# Patient Record
Sex: MALE | Race: WHITE | NOT HISPANIC OR LATINO
[De-identification: names, ages, dates, MRNs, and addresses within clinical notes are randomized per-mention and may not be internally consistent; named-entity substitution may affect disease eponyms.]

---

## 2019-03-06 ENCOUNTER — TRANSCRIPTION ENCOUNTER (OUTPATIENT)
Age: 69
End: 2019-03-06

## 2019-03-06 ENCOUNTER — EMERGENCY (EMERGENCY)
Facility: HOSPITAL | Age: 69
LOS: 1 days | Discharge: HOME | End: 2019-03-06
Attending: EMERGENCY MEDICINE | Admitting: ORTHOPAEDIC SURGERY
Payer: MEDICARE

## 2019-03-06 VITALS
TEMPERATURE: 98 F | SYSTOLIC BLOOD PRESSURE: 149 MMHG | DIASTOLIC BLOOD PRESSURE: 84 MMHG | OXYGEN SATURATION: 96 % | HEART RATE: 76 BPM | RESPIRATION RATE: 16 BRPM

## 2019-03-06 PROCEDURE — 99285 EMERGENCY DEPT VISIT HI MDM: CPT

## 2019-03-06 PROCEDURE — 73590 X-RAY EXAM OF LOWER LEG: CPT | Mod: 26,LT

## 2019-03-06 PROCEDURE — 73610 X-RAY EXAM OF ANKLE: CPT | Mod: 26,LT

## 2019-03-06 RX ORDER — OXYCODONE AND ACETAMINOPHEN 5; 325 MG/1; MG/1
1 TABLET ORAL ONCE
Qty: 0 | Refills: 0 | Status: DISCONTINUED | OUTPATIENT
Start: 2019-03-06 | End: 2019-03-06

## 2019-03-06 RX ORDER — OXYCODONE AND ACETAMINOPHEN 5; 325 MG/1; MG/1
1 TABLET ORAL
Qty: 20 | Refills: 0
Start: 2019-03-06 | End: 2019-03-10

## 2019-03-06 RX ADMIN — OXYCODONE AND ACETAMINOPHEN 1 TABLET(S): 5; 325 TABLET ORAL at 20:52

## 2019-03-06 RX ADMIN — OXYCODONE AND ACETAMINOPHEN 1 TABLET(S): 5; 325 TABLET ORAL at 21:22

## 2019-03-06 NOTE — ED PROVIDER NOTE - CLINICAL SUMMARY MEDICAL DECISION MAKING FREE TEXT BOX
Patient with left leg fibula fracture, seen by ortho, to be admitted for next day surgery. Patient seen with Tracey Waters, agree with HPI and plan.

## 2019-03-06 NOTE — ED ADULT NURSE NOTE - OBJECTIVE STATEMENT
Pt is a 68YOM who was walking outside and tripped and fell on the ICE, the patient then hurt his left ankle. Pt has swelling to the ankle, but not into the foot, pain is at the distal fib/tib

## 2019-03-06 NOTE — ED ADULT NURSE NOTE - ED STAT RN HANDOFF DETAILS
Report received from RN at 9pm. Assessment available on [facility]. will continue to monitor

## 2019-03-06 NOTE — ED ADULT NURSE REASSESSMENT NOTE - NS ED NURSE REASSESS COMMENT FT1
pt resting comfortably in bed E. family at bedside. awaiting ortho consult. pt c/o discomfort in left foot, ortho MD made aware.

## 2019-03-06 NOTE — ED PROVIDER NOTE - MUSCULOSKELETAL MINIMAL EXAM
LEFT ANKLE- MOD STS, PAIN ON ROM, GOOD PUSLE AND SENSORY DISTALLY, NO ERYTHEMA, NO WARMTH , LATERAL TENDERNESS

## 2019-03-06 NOTE — ED PROVIDER NOTE - OBJECTIVE STATEMENT
this is a 67 yo m w pmhx of htn, hypercholesterolemia and arthritis c/o of left ankle pain s/p fall on ice x 3 hrs. Denies loc, head trauma, neck pain, sob, chest pain, blood thinners. Pt not taken anything for the pain. pain worst w movement

## 2019-03-07 ENCOUNTER — TRANSCRIPTION ENCOUNTER (OUTPATIENT)
Age: 69
End: 2019-03-07

## 2019-03-07 VITALS
HEART RATE: 89 BPM | SYSTOLIC BLOOD PRESSURE: 127 MMHG | TEMPERATURE: 98 F | RESPIRATION RATE: 17 BRPM | DIASTOLIC BLOOD PRESSURE: 78 MMHG | OXYGEN SATURATION: 96 %

## 2019-03-07 DIAGNOSIS — E78.2 MIXED HYPERLIPIDEMIA: ICD-10-CM

## 2019-03-07 DIAGNOSIS — I10 ESSENTIAL (PRIMARY) HYPERTENSION: ICD-10-CM

## 2019-03-07 DIAGNOSIS — M19.90 UNSPECIFIED OSTEOARTHRITIS, UNSPECIFIED SITE: ICD-10-CM

## 2019-03-07 DIAGNOSIS — D72.829 ELEVATED WHITE BLOOD CELL COUNT, UNSPECIFIED: ICD-10-CM

## 2019-03-07 DIAGNOSIS — S82.832A OTHER FRACTURE OF UPPER AND LOWER END OF LEFT FIBULA, INITIAL ENCOUNTER FOR CLOSED FRACTURE: ICD-10-CM

## 2019-03-07 DIAGNOSIS — Z29.9 ENCOUNTER FOR PROPHYLACTIC MEASURES, UNSPECIFIED: ICD-10-CM

## 2019-03-07 LAB
ANION GAP SERPL CALC-SCNC: 5 MMOL/L — SIGNIFICANT CHANGE UP (ref 5–17)
ANION GAP SERPL CALC-SCNC: 6 MMOL/L — SIGNIFICANT CHANGE UP (ref 5–17)
APPEARANCE UR: CLEAR — SIGNIFICANT CHANGE UP
BILIRUB UR-MCNC: NEGATIVE — SIGNIFICANT CHANGE UP
BUN SERPL-MCNC: 27 MG/DL — HIGH (ref 7–23)
BUN SERPL-MCNC: 28 MG/DL — HIGH (ref 7–23)
CALCIUM SERPL-MCNC: 9.1 MG/DL — SIGNIFICANT CHANGE UP (ref 8.5–10.1)
CALCIUM SERPL-MCNC: 9.1 MG/DL — SIGNIFICANT CHANGE UP (ref 8.5–10.1)
CHLORIDE SERPL-SCNC: 106 MMOL/L — SIGNIFICANT CHANGE UP (ref 96–108)
CHLORIDE SERPL-SCNC: 107 MMOL/L — SIGNIFICANT CHANGE UP (ref 96–108)
CO2 SERPL-SCNC: 28 MMOL/L — SIGNIFICANT CHANGE UP (ref 22–31)
CO2 SERPL-SCNC: 29 MMOL/L — SIGNIFICANT CHANGE UP (ref 22–31)
COLOR SPEC: YELLOW — SIGNIFICANT CHANGE UP
CREAT SERPL-MCNC: 1.18 MG/DL — SIGNIFICANT CHANGE UP (ref 0.5–1.3)
CREAT SERPL-MCNC: 1.36 MG/DL — HIGH (ref 0.5–1.3)
DIFF PNL FLD: NEGATIVE — SIGNIFICANT CHANGE UP
GLUCOSE SERPL-MCNC: 116 MG/DL — HIGH (ref 70–99)
GLUCOSE SERPL-MCNC: 146 MG/DL — HIGH (ref 70–99)
GLUCOSE UR QL: NEGATIVE MG/DL — SIGNIFICANT CHANGE UP
HCT VFR BLD CALC: 35.6 % — LOW (ref 39–50)
HCT VFR BLD CALC: 35.8 % — LOW (ref 39–50)
HCV AB S/CO SERPL IA: 0.19 S/CO — SIGNIFICANT CHANGE UP (ref 0–0.79)
HCV AB SERPL-IMP: SIGNIFICANT CHANGE UP
HGB BLD-MCNC: 11.7 G/DL — LOW (ref 13–17)
HGB BLD-MCNC: 11.7 G/DL — LOW (ref 13–17)
INR BLD: 0.99 RATIO — SIGNIFICANT CHANGE UP (ref 0.88–1.16)
INR BLD: 1.02 RATIO — SIGNIFICANT CHANGE UP (ref 0.88–1.16)
KETONES UR-MCNC: NEGATIVE — SIGNIFICANT CHANGE UP
LEUKOCYTE ESTERASE UR-ACNC: NEGATIVE — SIGNIFICANT CHANGE UP
MCHC RBC-ENTMCNC: 28.9 PG — SIGNIFICANT CHANGE UP (ref 27–34)
MCHC RBC-ENTMCNC: 29 PG — SIGNIFICANT CHANGE UP (ref 27–34)
MCHC RBC-ENTMCNC: 32.7 GM/DL — SIGNIFICANT CHANGE UP (ref 32–36)
MCHC RBC-ENTMCNC: 32.9 GM/DL — SIGNIFICANT CHANGE UP (ref 32–36)
MCV RBC AUTO: 88.1 FL — SIGNIFICANT CHANGE UP (ref 80–100)
MCV RBC AUTO: 88.4 FL — SIGNIFICANT CHANGE UP (ref 80–100)
NITRITE UR-MCNC: NEGATIVE — SIGNIFICANT CHANGE UP
NRBC # BLD: 0 /100 WBCS — SIGNIFICANT CHANGE UP (ref 0–0)
NRBC # BLD: 0 /100 WBCS — SIGNIFICANT CHANGE UP (ref 0–0)
PH UR: 5 — SIGNIFICANT CHANGE UP (ref 5–8)
PLATELET # BLD AUTO: 180 K/UL — SIGNIFICANT CHANGE UP (ref 150–400)
PLATELET # BLD AUTO: 215 K/UL — SIGNIFICANT CHANGE UP (ref 150–400)
POTASSIUM SERPL-MCNC: 3.8 MMOL/L — SIGNIFICANT CHANGE UP (ref 3.5–5.3)
POTASSIUM SERPL-MCNC: 4.1 MMOL/L — SIGNIFICANT CHANGE UP (ref 3.5–5.3)
POTASSIUM SERPL-SCNC: 3.8 MMOL/L — SIGNIFICANT CHANGE UP (ref 3.5–5.3)
POTASSIUM SERPL-SCNC: 4.1 MMOL/L — SIGNIFICANT CHANGE UP (ref 3.5–5.3)
PROT UR-MCNC: NEGATIVE MG/DL — SIGNIFICANT CHANGE UP
PROTHROM AB SERPL-ACNC: 11.1 SEC — SIGNIFICANT CHANGE UP (ref 10–12.9)
PROTHROM AB SERPL-ACNC: 11.4 SEC — SIGNIFICANT CHANGE UP (ref 10–12.9)
RBC # BLD: 4.04 M/UL — LOW (ref 4.2–5.8)
RBC # BLD: 4.05 M/UL — LOW (ref 4.2–5.8)
RBC # FLD: 14.1 % — SIGNIFICANT CHANGE UP (ref 10.3–14.5)
RBC # FLD: 14.1 % — SIGNIFICANT CHANGE UP (ref 10.3–14.5)
SODIUM SERPL-SCNC: 140 MMOL/L — SIGNIFICANT CHANGE UP (ref 135–145)
SODIUM SERPL-SCNC: 141 MMOL/L — SIGNIFICANT CHANGE UP (ref 135–145)
SP GR SPEC: 1.02 — SIGNIFICANT CHANGE UP (ref 1.01–1.02)
UROBILINOGEN FLD QL: NEGATIVE MG/DL — SIGNIFICANT CHANGE UP
WBC # BLD: 12.7 K/UL — HIGH (ref 3.8–10.5)
WBC # BLD: 13.34 K/UL — HIGH (ref 3.8–10.5)
WBC # FLD AUTO: 12.7 K/UL — HIGH (ref 3.8–10.5)
WBC # FLD AUTO: 13.34 K/UL — HIGH (ref 3.8–10.5)

## 2019-03-07 PROCEDURE — 71045 X-RAY EXAM CHEST 1 VIEW: CPT | Mod: 26

## 2019-03-07 PROCEDURE — 99222 1ST HOSP IP/OBS MODERATE 55: CPT | Mod: 57,AI

## 2019-03-07 PROCEDURE — 27829 TREAT LOWER LEG JOINT: CPT | Mod: LT

## 2019-03-07 PROCEDURE — 27792 TREATMENT OF ANKLE FRACTURE: CPT | Mod: LT

## 2019-03-07 PROCEDURE — 73600 X-RAY EXAM OF ANKLE: CPT | Mod: 26,LT

## 2019-03-07 PROCEDURE — 76000 FLUOROSCOPY <1 HR PHYS/QHP: CPT | Mod: 26

## 2019-03-07 PROCEDURE — 99223 1ST HOSP IP/OBS HIGH 75: CPT

## 2019-03-07 RX ORDER — HEPARIN SODIUM 5000 [USP'U]/ML
5000 INJECTION INTRAVENOUS; SUBCUTANEOUS ONCE
Qty: 0 | Refills: 0 | Status: COMPLETED | OUTPATIENT
Start: 2019-03-07 | End: 2019-03-07

## 2019-03-07 RX ORDER — ASCORBIC ACID 60 MG
500 TABLET,CHEWABLE ORAL
Qty: 0 | Refills: 0 | Status: DISCONTINUED | OUTPATIENT
Start: 2019-03-07 | End: 2019-03-07

## 2019-03-07 RX ORDER — SIMVASTATIN 20 MG/1
20 TABLET, FILM COATED ORAL AT BEDTIME
Qty: 0 | Refills: 0 | Status: DISCONTINUED | OUTPATIENT
Start: 2019-03-07 | End: 2019-03-07

## 2019-03-07 RX ORDER — FERROUS SULFATE 325(65) MG
325 TABLET ORAL
Qty: 0 | Refills: 0 | Status: DISCONTINUED | OUTPATIENT
Start: 2019-03-07 | End: 2019-03-07

## 2019-03-07 RX ORDER — OXYCODONE HYDROCHLORIDE 5 MG/1
1 TABLET ORAL
Qty: 40 | Refills: 0 | OUTPATIENT
Start: 2019-03-07

## 2019-03-07 RX ORDER — SODIUM CHLORIDE 9 MG/ML
1000 INJECTION, SOLUTION INTRAVENOUS
Qty: 0 | Refills: 0 | Status: DISCONTINUED | OUTPATIENT
Start: 2019-03-07 | End: 2019-03-07

## 2019-03-07 RX ORDER — LOSARTAN POTASSIUM 100 MG/1
50 TABLET, FILM COATED ORAL DAILY
Qty: 0 | Refills: 0 | Status: DISCONTINUED | OUTPATIENT
Start: 2019-03-07 | End: 2019-03-07

## 2019-03-07 RX ORDER — ACETAMINOPHEN 500 MG
650 TABLET ORAL EVERY 6 HOURS
Qty: 0 | Refills: 0 | Status: DISCONTINUED | OUTPATIENT
Start: 2019-03-07 | End: 2019-03-07

## 2019-03-07 RX ORDER — MORPHINE SULFATE 50 MG/1
4 CAPSULE, EXTENDED RELEASE ORAL
Qty: 0 | Refills: 0 | Status: DISCONTINUED | OUTPATIENT
Start: 2019-03-07 | End: 2019-03-07

## 2019-03-07 RX ORDER — FOLIC ACID 0.8 MG
1 TABLET ORAL DAILY
Qty: 0 | Refills: 0 | Status: DISCONTINUED | OUTPATIENT
Start: 2019-03-07 | End: 2019-03-07

## 2019-03-07 RX ORDER — INFLUENZA VIRUS VACCINE 15; 15; 15; 15 UG/.5ML; UG/.5ML; UG/.5ML; UG/.5ML
0.5 SUSPENSION INTRAMUSCULAR ONCE
Qty: 0 | Refills: 0 | Status: DISCONTINUED | OUTPATIENT
Start: 2019-03-07 | End: 2019-03-07

## 2019-03-07 RX ORDER — DOCUSATE SODIUM 100 MG
100 CAPSULE ORAL THREE TIMES A DAY
Qty: 0 | Refills: 0 | Status: DISCONTINUED | OUTPATIENT
Start: 2019-03-07 | End: 2019-03-07

## 2019-03-07 RX ORDER — ONDANSETRON 8 MG/1
1 TABLET, FILM COATED ORAL
Qty: 20 | Refills: 0
Start: 2019-03-07

## 2019-03-07 RX ORDER — HYDROMORPHONE HYDROCHLORIDE 2 MG/ML
0.5 INJECTION INTRAMUSCULAR; INTRAVENOUS; SUBCUTANEOUS EVERY 6 HOURS
Qty: 0 | Refills: 0 | Status: DISCONTINUED | OUTPATIENT
Start: 2019-03-07 | End: 2019-03-07

## 2019-03-07 RX ORDER — ONDANSETRON 8 MG/1
4 TABLET, FILM COATED ORAL EVERY 6 HOURS
Qty: 0 | Refills: 0 | Status: DISCONTINUED | OUTPATIENT
Start: 2019-03-07 | End: 2019-03-07

## 2019-03-07 RX ORDER — OXYCODONE HYDROCHLORIDE 5 MG/1
5 TABLET ORAL EVERY 4 HOURS
Qty: 0 | Refills: 0 | Status: DISCONTINUED | OUTPATIENT
Start: 2019-03-07 | End: 2019-03-07

## 2019-03-07 RX ORDER — CEFAZOLIN SODIUM 1 G
2000 VIAL (EA) INJECTION EVERY 8 HOURS
Qty: 0 | Refills: 0 | Status: DISCONTINUED | OUTPATIENT
Start: 2019-03-07 | End: 2019-03-07

## 2019-03-07 RX ORDER — OXYCODONE HYDROCHLORIDE 5 MG/1
10 TABLET ORAL EVERY 4 HOURS
Qty: 0 | Refills: 0 | Status: DISCONTINUED | OUTPATIENT
Start: 2019-03-07 | End: 2019-03-07

## 2019-03-07 RX ORDER — ASPIRIN/CALCIUM CARB/MAGNESIUM 324 MG
1 TABLET ORAL
Qty: 60 | Refills: 0
Start: 2019-03-07 | End: 2019-04-05

## 2019-03-07 RX ORDER — HYDROMORPHONE HYDROCHLORIDE 2 MG/ML
0.5 INJECTION INTRAMUSCULAR; INTRAVENOUS; SUBCUTANEOUS
Qty: 0 | Refills: 0 | Status: DISCONTINUED | OUTPATIENT
Start: 2019-03-07 | End: 2019-03-07

## 2019-03-07 RX ORDER — METOCLOPRAMIDE HCL 10 MG
10 TABLET ORAL ONCE
Qty: 0 | Refills: 0 | Status: DISCONTINUED | OUTPATIENT
Start: 2019-03-07 | End: 2019-03-07

## 2019-03-07 RX ORDER — DIPHENHYDRAMINE HCL 50 MG
25 CAPSULE ORAL AT BEDTIME
Qty: 0 | Refills: 0 | Status: DISCONTINUED | OUTPATIENT
Start: 2019-03-07 | End: 2019-03-07

## 2019-03-07 RX ORDER — DOCUSATE SODIUM 100 MG
1 CAPSULE ORAL
Qty: 20 | Refills: 0
Start: 2019-03-07

## 2019-03-07 RX ORDER — MAGNESIUM HYDROXIDE 400 MG/1
30 TABLET, CHEWABLE ORAL DAILY
Qty: 0 | Refills: 0 | Status: DISCONTINUED | OUTPATIENT
Start: 2019-03-07 | End: 2019-03-07

## 2019-03-07 RX ORDER — ASPIRIN/CALCIUM CARB/MAGNESIUM 324 MG
325 TABLET ORAL
Qty: 0 | Refills: 0 | Status: DISCONTINUED | OUTPATIENT
Start: 2019-03-07 | End: 2019-03-07

## 2019-03-07 RX ORDER — CELECOXIB 200 MG/1
1 CAPSULE ORAL
Qty: 0 | Refills: 0 | COMMUNITY

## 2019-03-07 RX ADMIN — HYDROMORPHONE HYDROCHLORIDE 0.5 MILLIGRAM(S): 2 INJECTION INTRAMUSCULAR; INTRAVENOUS; SUBCUTANEOUS at 03:14

## 2019-03-07 RX ADMIN — OXYCODONE HYDROCHLORIDE 10 MILLIGRAM(S): 5 TABLET ORAL at 09:00

## 2019-03-07 RX ADMIN — HEPARIN SODIUM 5000 UNIT(S): 5000 INJECTION INTRAVENOUS; SUBCUTANEOUS at 02:38

## 2019-03-07 RX ADMIN — LOSARTAN POTASSIUM 50 MILLIGRAM(S): 100 TABLET, FILM COATED ORAL at 18:02

## 2019-03-07 RX ADMIN — SODIUM CHLORIDE 75 MILLILITER(S): 9 INJECTION, SOLUTION INTRAVENOUS at 02:40

## 2019-03-07 RX ADMIN — OXYCODONE HYDROCHLORIDE 10 MILLIGRAM(S): 5 TABLET ORAL at 08:05

## 2019-03-07 RX ADMIN — SODIUM CHLORIDE 125 MILLILITER(S): 9 INJECTION, SOLUTION INTRAVENOUS at 18:01

## 2019-03-07 NOTE — PHYSICAL THERAPY INITIAL EVALUATION ADULT - ADDITIONAL COMMENTS
Pt lives in a private house with wife. Pt has 5 stairs to enter with B wide handrails and 12 stairs to bedroom. Pt will be staying on the first floor. Pt has cane and no other AD. Prior pt I with all ADLs

## 2019-03-07 NOTE — CHART NOTE - NSCHARTNOTEFT_GEN_A_CORE
Orthopedic Preop Note    Preop Dx: left ankle fracture  Surgeon: Rochelle  Procedure: ORIF    Vital Signs Last 24 Hrs  T(C): 37.3 (07 Mar 2019 05:32), Max: 37.3 (07 Mar 2019 05:32)  T(F): 99.1 (07 Mar 2019 05:32), Max: 99.1 (07 Mar 2019 05:32)  HR: 82 (07 Mar 2019 05:32) (71 - 82)  BP: 145/82 (07 Mar 2019 05:32) (145/82 - 152/94)  BP(mean): --  RR: 17 (07 Mar 2019 05:32) (15 - 18)  SpO2: 97% (07 Mar 2019 05:32) (96% - 98%)  CBC Full  -  ( 07 Mar 2019 08:15 )  WBC Count : 13.34 K/uL  Hemoglobin : 11.7 g/dL  Hematocrit : 35.8 %  Platelet Count - Automated : 180 K/uL  Mean Cell Volume : 88.4 fl  Mean Cell Hemoglobin : 28.9 pg  Mean Cell Hemoglobin Concentration : 32.7 gm/dL  Auto Neutrophil # : x  Auto Lymphocyte # : x  Auto Monocyte # : x  Auto Eosinophil # : x  Auto Basophil # : x  Auto Neutrophil % : x  Auto Lymphocyte % : x  Auto Monocyte % : x  Auto Eosinophil % : x  Auto Basophil % : x    03-07    141  |  107  |  27<H>  ----------------------------<  116<H>  4.1   |  29  |  1.18    Ca    9.1      07 Mar 2019 08:15      PT/INR - ( 07 Mar 2019 08:15 )   PT: 11.4 sec;   INR: 1.02 ratio           Daily Height in cm: 170.18 (07 Mar 2019 04:06)    Daily     EKG: In Chart  CXR: Done  UA: Done  Type and Screen: done  Clearance: documented  Consent: In Chart     Plan:  OR 3/7/19 for left ankle ORIF with Dr. Byrd  NPO except meds  hold anticoagulation   IVF while NPO

## 2019-03-07 NOTE — H&P ADULT - ATTENDING COMMENTS
69yo male L ankle wagner C fracture with syndesmotic involvement.  plan for OR today for fixation of distal fibula and syndesmosis. patient seen and examined.  agree with resident assessment and plan

## 2019-03-07 NOTE — DISCHARGE NOTE PROVIDER - HOSPITAL COURSE
The patient is a 68 year old M status post Open Reduction Surgical Fixation of a Left ankle Fracture after being admitted through [facility] Emergency Room. The Patient was medically Optimized for the Previously mentioned surgical procedure. The patient was taken to the operating room on date mentioned above (3/7). Prophylactic antibiotics were started before the procedure and continued for 24 hours.  There were no complications during the procedure and patient tolerated the procedure well.  The patient was transferred to recovery room in stable condition and subsequently to surgical floor.  Patient was placed on Aspirin for anticoagulation.  All home medications were continued.  The patient received physical therapy daily and daily labs were followed.  The Splint was kept clean, dry, intact. The rest of the hospital stay was unremarkable

## 2019-03-07 NOTE — H&P ADULT - HISTORY OF PRESENT ILLNESS
68y Male community ambulatory presents c/o R/L ankle pain sp mechanical fall. Denies HS/LOC. Denies numbness/tingling. No other pain/injuries. Denies fevers/chills.     HEALTH ISSUES - PROBLEM Dx:        MEDICATIONS  (STANDING):  ascorbic acid 500 milliGRAM(s) Oral two times a day  ferrous    sulfate 325 milliGRAM(s) Oral three times a day with meals  folic acid 1 milliGRAM(s) Oral daily  heparin  Injectable 5000 Unit(s) SubCutaneous once  lactated ringers. 1000 milliLiter(s) IV Continuous <Continuous>  multivitamin 1 Tablet(s) Oral daily    Allergies    No Known Allergies    Intolerances                  Vital Signs Last 24 Hrs  T(C): 36.4 (03-06-19 @ 20:06), Max: 36.4 (03-06-19 @ 20:06)  T(F): 97.5 (03-06-19 @ 20:06), Max: 97.5 (03-06-19 @ 20:06)  HR: 76 (03-06-19 @ 20:06) (76 - 76)  BP: 149/84 (03-06-19 @ 20:06) (149/84 - 149/84)  BP(mean): --  RR: 16 (03-06-19 @ 20:06) (16 - 16)  SpO2: 96% (03-06-19 @ 20:06) (96% - 96%)    Imaging: XR demonstrates R/L ankle fracture    Physical Exam  Gen: Nad  R/L LE: Skin intact, +TTP medial/lateral malleolus, no bony ttp elsewhere, +ehl/fhl/ta/gs function, L3-S1 SILT, dp/pt pulse intact, negative log roll, able to SLR, compartments soft/compressive, extremity warm/well perfused  Secondary Survey: Benign, no bony ttp elsewhere, NV intact    Procedure: 10 cc 1% lidocaine injected under sterile procedure into L ankle joint. Placed in well padded trilam splint. Post procedure imaging obtained. Post procedure exam unchanged, NV intact, able to move all toes, SILT distally.     A/P: 68y Male with L bimalleolar equivalent fracture with probable syndesmotic injury   plan for OR 3/7/19 with Dr. Byrd  NPO  IVF while NPO  hold all AC   Pain control  NWB L LE in splint, keep c/d/I, cane/crutches/walker as needed  Ice/elevation  Si/sx compartment syndrome discussed with patient, told to make nurse/physician aware  discussed with attending, agrees with above 68y Male community ambulatory presents c/o L ankle pain sp mechanical fall after slipping on ice. Denies HS/LOC. Denies numbness/tingling. No other pain/injuries. Denies fevers/chills.     HEALTH ISSUES - PROBLEM Dx:    HTN  HLD    MEDICATIONS  (STANDING):  ascorbic acid 500 milliGRAM(s) Oral two times a day  ferrous    sulfate 325 milliGRAM(s) Oral three times a day with meals  folic acid 1 milliGRAM(s) Oral daily  heparin  Injectable 5000 Unit(s) SubCutaneous once  lactated ringers. 1000 milliLiter(s) IV Continuous <Continuous>  multivitamin 1 Tablet(s) Oral daily    Allergies    No Known Allergies    Intolerances        Vital Signs Last 24 Hrs  T(C): 36.4 (03-06-19 @ 20:06), Max: 36.4 (03-06-19 @ 20:06)  T(F): 97.5 (03-06-19 @ 20:06), Max: 97.5 (03-06-19 @ 20:06)  HR: 76 (03-06-19 @ 20:06) (76 - 76)  BP: 149/84 (03-06-19 @ 20:06) (149/84 - 149/84)  BP(mean): --  RR: 16 (03-06-19 @ 20:06) (16 - 16)  SpO2: 96% (03-06-19 @ 20:06) (96% - 96%)    Imaging: XR demonstrates L bimalleolar equivalent fracture with possible syndesmotic injury     Physical Exam  Gen: Nad  L LE: Skin intact, +TTP medial/lateral malleolus, no bony ttp elsewhere, +ehl/fhl/ta/gs function, L3-S1 SILT, dp/pt pulse intact, negative log roll, able to SLR, compartments soft/compressive, extremity warm/well perfused  Secondary Survey: Benign, no bony ttp elsewhere, NV intact    Procedure: 10 cc 1% lidocaine injected under sterile procedure into L ankle joint. Placed in well padded trilam splint. Post procedure imaging obtained. Post procedure exam unchanged, NV intact, able to move all toes, SILT distally.     A/P: 68y Male with L bimalleolar equivalent fracture with probable syndesmotic injury   plan for OR 3/7/19 with Dr. Byrd  NPO  IVF while NPO  hold all AC   Pain control  NWB L LE in splint, keep c/d/I, cane/crutches/walker as needed  Ice/elevation  Si/sx compartment syndrome discussed with patient, told to make nurse/physician aware  discussed with attending, agrees with above

## 2019-03-07 NOTE — PROGRESS NOTE ADULT - SUBJECTIVE AND OBJECTIVE BOX
Patient is seen and examined at bedside. Denies CP/SOB/Dizziness/N/V/D/HA. Pain is controlled.     Vital Signs Last 24 Hrs  T(C): 37.3 (07 Mar 2019 17:58), Max: 37.3 (07 Mar 2019 05:32)  T(F): 99.1 (07 Mar 2019 17:58), Max: 99.2 (07 Mar 2019 14:07)  HR: 81 (07 Mar 2019 17:58) (70 - 86)  BP: 109/71 (07 Mar 2019 17:58) (109/71 - 152/94)  BP(mean): --  RR: 16 (07 Mar 2019 17:58) (13 - 26)  SpO2: 95% (07 Mar 2019 17:58) (95% - 98%)    GEN: NAD  LLE: Splint C/D/I.    Motor intact + EHL/FHL in the BL LE. Sensation is grossly intact distal . Extremity warm. Compartments are soft. DP 1+    Labs:                          11.7   13.34 )-----------( 180      ( 07 Mar 2019 08:15 )             35.8       03-07    141  |  107  |  27<H>  ----------------------------<  116<H>  4.1   |  29  |  1.18    Ca    9.1      07 Mar 2019 08:15        A/P: Patient is a 68y y/o Male s/p L Ankle Joshua Fx, POD # 0    -Pain control/analgesia  -Inc spirometry  -Venodynes/foot pumps  -F/U AM Labs  -PT/OT/WBAT  -Antibiotic  -Anticoagulation  -DC HOME NOW

## 2019-03-07 NOTE — CONSULT NOTE ADULT - SUBJECTIVE AND OBJECTIVE BOX
Pt is a 68y Male w/pmhx of HTN, HLD and OA physically fairly active, no prior cardiac hx or w/u presents c/o L ankle pain sp mechanical fall after slipping on ice.  States was putting granddaughter in car and slipped on ice fell and granddaughter fell on his ankle.  Pt Denies HS/LOC. Denies numbness/tingling. No other pain/injuries. Denies fevers/chills. Pt denies any cp palpitations, sob after strenuous physical activities which he was doing prior to this event.    HEALTH ISSUES - PROBLEM Dx:    HTN  HLD    MEDICATIONS  (STANDING):  ascorbic acid 500 milliGRAM(s) Oral two times a day  ferrous    sulfate 325 milliGRAM(s) Oral three times a day with meals  folic acid 1 milliGRAM(s) Oral daily  heparin  Injectable 5000 Unit(s) SubCutaneous once  lactated ringers. 1000 milliLiter(s) IV Continuous <Continuous>  multivitamin 1 Tablet(s) Oral daily    Allergies    No Known Allergies    Intolerances          Imaging: XR demonstrates L bimalleolar equivalent fracture with possible syndesmotic injury     Hypertension  Arthritis  No significant past surgical history      REVIEW OF SYSTEMS:    CONSTITUTIONAL: No fever, weight loss, or fatigue  EYES: No eye pain, visual disturbances, or discharge  ENMT:  No difficulty hearing, tinnitus, vertigo; No sinus or throat pain  NECK: No pain or stiffness  BREASTS: No pain, masses, or nipple discharge  RESPIRATORY: No cough, wheezing, chills or hemoptysis; No shortness of breath  CARDIOVASCULAR: No chest pain, palpitations, dizziness, or leg swelling  GASTROINTESTINAL: No abdominal or epigastric pain. No nausea, vomiting, or hematemesis; No diarrhea or constipation. No melena or hematochezia.  GENITOURINARY: No dysuria, frequency, hematuria, or incontinence  NEUROLOGICAL: No headaches, memory loss, loss of strength, numbness, or tremors  SKIN: No itching, burning, rashes, or lesions   LYMPH NODES: No enlarged glands  ENDOCRINE: No heat or cold intolerance; No hair loss  MUSCULOSKELETAL: OA and pain in ankle as above  PSYCHIATRIC: No depression, anxiety, mood swings, or difficulty sleeping  HEME/LYMPH: No easy bruising, or bleeding gums  ALLERGY AND IMMUNOLOGIC: No hives or eczema      MEDICATIONS  (STANDING):  ascorbic acid 500 milliGRAM(s) Oral two times a day  ferrous    sulfate 325 milliGRAM(s) Oral three times a day with meals  folic acid 1 milliGRAM(s) Oral daily  influenza   Vaccine 0.5 milliLiter(s) IntraMuscular once  lactated ringers. 1000 milliLiter(s) (75 mL/Hr) IV Continuous <Continuous>  multivitamin 1 Tablet(s) Oral daily  simvastatin 20 milliGRAM(s) Oral at bedtime    MEDICATIONS  (PRN):  HYDROmorphone  Injectable 0.5 milliGRAM(s) IV Push every 6 hours PRN Severe Pain (7 - 10)  oxyCODONE    IR 10 milliGRAM(s) Oral every 4 hours PRN Moderate Pain (4 - 6)  oxyCODONE    IR 5 milliGRAM(s) Oral every 4 hours PRN Mild Pain (1 - 3)      Allergies    No Known Allergies    Intolerances        SOCIAL HISTORY:    FAMILY HISTORY:  Mother CAD in 70's      Physical Exam    Vital Signs Last 24 Hrs  T(C): 37.3 (07 Mar 2019 05:32), Max: 37.3 (07 Mar 2019 05:32)  T(F): 99.1 (07 Mar 2019 05:32), Max: 99.1 (07 Mar 2019 05:32)  HR: 82 (07 Mar 2019 05:32) (71 - 82)  BP: 145/82 (07 Mar 2019 05:32) (145/82 - 152/94)  BP(mean): --  RR: 17 (07 Mar 2019 05:32) (15 - 18)  SpO2: 97% (07 Mar 2019 05:32) (96% - 98%)    PHYSICAL EXAM:    GENERAL: NAD, well-groomed, well-developed  HEAD:  Atraumatic, Normocephalic  EYES: EOMI, PERRLA, conjunctiva and sclera clear  ENMT: No tonsillar erythema, exudates, or enlargement; Moist mucous membranes, No lesions  NECK: Supple, No JVD, Normal thyroid  NERVOUS SYSTEM:  Alert & Oriented X3, Good concentration; Motor Strength 5/5 B/L upper and R lower extremities (left not moved w/pain); DTRs 2+ intact and symmetric  CHEST/LUNG: Clear to percussion bilaterally; No rales, rhonchi, wheezing, or rubs  HEART: Regular rate and rhythm; No rubs, or gallops, +S1,S2  ABDOMEN: Soft, Nontender, Nondistended; Bowel sounds present  EXTREMITIES:  2+ Peripheral Pulses, No clubbing, cyanosis, or edema  LYMPH: No cervical adenopathy  RECTAL: deferred  BREAST: No palpatble masses, skin no lesions   : deferred  SKIN: No rashes or lesions            LABS:                        11.7   12.70 )-----------( 215      ( 07 Mar 2019 01:36 )             35.6     03-07    140  |  106  |  28<H>  ----------------------------<  146<H>  3.8   |  28  |  1.36<H>    Ca    9.1      07 Mar 2019 01:36      PT/INR - ( 07 Mar 2019 02:28 )   PT: 11.1 sec;   INR: 0.99 ratio           Urinalysis Basic - ( 07 Mar 2019 02:31 )    Color: Yellow / Appearance: Clear / S.025 / pH: x  Gluc: x / Ketone: Negative  / Bili: Negative / Urobili: Negative mg/dL   Blood: x / Protein: Negative mg/dL / Nitrite: Negative   Leuk Esterase: Negative / RBC: x / WBC x   Sq Epi: x / Non Sq Epi: x / Bacteria: x        RADIOLOGY & ADDITIONAL STUDIES:

## 2019-03-07 NOTE — PROGRESS NOTE ADULT - SUBJECTIVE AND OBJECTIVE BOX
Patient seen and examined. Pain controlled. No acute events overnight    Vital Signs Last 24 Hrs  T(C): 37.3 (03-07-19 @ 05:32), Max: 37.3 (03-07-19 @ 05:32)  T(F): 99.1 (03-07-19 @ 05:32), Max: 99.1 (03-07-19 @ 05:32)  HR: 82 (03-07-19 @ 05:32) (71 - 82)  BP: 145/82 (03-07-19 @ 05:32) (145/82 - 152/94)  BP(mean): --  RR: 17 (03-07-19 @ 05:32) (15 - 18)  SpO2: 97% (03-07-19 @ 05:32) (96% - 98%)    Physical Exam  Gen: NAD  LLE:   Dressing c/d/i  +wiggles toes  SILT toes 1-5  in splint  DP +  Compartments soft  No calf ttp    A/P: 68y Male with L bimalleolar equivalent fracture with syndesmotic injury   plan for OR today   NPO  IVF while NPO   hold all AC   Pain control  DVT ppx  PT/OT, NWB LLE  FU labs  Dispo planning  D/w attending

## 2019-03-07 NOTE — PROGRESS NOTE ADULT - ASSESSMENT
Pt is a 68y Male w/pmhx of HTN, HLD and OA, anxiety (takes medication at night but does not recall name) physically fairly active, no prior cardiac hx or w/u presents c/o L ankle pain sp mechanical fall after slipping on ice.

## 2019-03-07 NOTE — BRIEF OPERATIVE NOTE - PROCEDURE
<<-----Click on this checkbox to enter Procedure Open reduction and internal fixation (ORIF) of ankle  03/07/2019  distal fibula ORIF, syndesmotic screw fixation  Active  NEHA

## 2019-03-07 NOTE — DISCHARGE NOTE PROVIDER - CARE PROVIDER_API CALL
Kaiden Byrd)  Orthopaedic Surgery  [address], [address]  [place] [address]  Phone: [phone]  Fax: [phone]  Follow Up Time:

## 2019-03-07 NOTE — ED ADULT NURSE REASSESSMENT NOTE - NS ED NURSE REASSESS COMMENT FT1
pt resting comfortably in bed E. pain medication administered. awaiting transport to unit. safety measures continued. repositioned for comfort. LR running in IV

## 2019-03-07 NOTE — PROGRESS NOTE ADULT - SUBJECTIVE AND OBJECTIVE BOX
Patient is a 68y old  Male who presents with a chief complaint of left ankle pain (07 Mar 2019 07:29)      INTERVAL HPI/OVERNIGHT EVENTS:  c/o ankle pain with movement, otherwise "sore", no cp, no sob, no n/v, no fevers or chills, no dysuria    MEDICATIONS  (STANDING):  ascorbic acid 500 milliGRAM(s) Oral two times a day  ferrous    sulfate 325 milliGRAM(s) Oral three times a day with meals  folic acid 1 milliGRAM(s) Oral daily  influenza   Vaccine 0.5 milliLiter(s) IntraMuscular once  lactated ringers. 1000 milliLiter(s) (75 mL/Hr) IV Continuous <Continuous>  multivitamin 1 Tablet(s) Oral daily  simvastatin 20 milliGRAM(s) Oral at bedtime    MEDICATIONS  (PRN):  HYDROmorphone  Injectable 0.5 milliGRAM(s) IV Push every 6 hours PRN Severe Pain (7 - 10)  oxyCODONE    IR 10 milliGRAM(s) Oral every 4 hours PRN Moderate Pain (4 - 6)  oxyCODONE    IR 5 milliGRAM(s) Oral every 4 hours PRN Mild Pain (1 - 3)      Allergies    No Known Allergies    Intolerances        REVIEW OF SYSTEMS:  CONSTITUTIONAL: No fever, weight loss, + fatigue  EYES: No eye pain, visual disturbances, or discharge  ENMT:  No difficulty hearing, tinnitus, vertigo; No sinus or throat pain  RESPIRATORY: No cough, wheezing, chills or hemoptysis; No shortness of breath  CARDIOVASCULAR: No chest pain, palpitations, dizziness, or leg swelling  GASTROINTESTINAL: No abdominal or epigastric pain. No nausea, vomiting, or hematemesis; No diarrhea or constipation. No melena or hematochezia.  GENITOURINARY: No dysuria, frequency, hematuria, or incontinence  NEUROLOGICAL: No headaches, memory loss, loss of strength, numbness, or tremors  MUSCULOSKELETAL:+ ankle pain  PSYCHIATRIC: No depression, anxiety, mood swings, or difficulty sleeping      Vital Signs Last 24 Hrs  T(C): 37.3 (07 Mar 2019 05:32), Max: 37.3 (07 Mar 2019 05:32)  T(F): 99.1 (07 Mar 2019 05:32), Max: 99.1 (07 Mar 2019 05:32)  HR: 82 (07 Mar 2019 05:32) (71 - 82)  BP: 145/82 (07 Mar 2019 05:32) (145/82 - 152/94)  BP(mean): --  RR: 17 (07 Mar 2019 05:32) (15 - 18)  SpO2: 97% (07 Mar 2019 05:32) (96% - 98%)    PHYSICAL EXAM:  GENERAL: NAD, well-groomed, well-developed  HEAD:  Atraumatic, Normocephalic  EYES: EOMI, PERRLA, conjunctiva and sclera clear  ENMT: No tonsillar erythema, exudates, or enlargement; Moist mucous membranes, Good dentition, No lesions  NECK: Supple, No JVD, Normal thyroid  NERVOUS SYSTEM:  Alert & Oriented X3, Good concentration; Motor Strength 5/5 B/L upper and lower extremities; DTRs 2+ intact and symmetric  CHEST/LUNG: Clear to percussion bilaterally; No rales, rhonchi, wheezing, or rubs  HEART: Regular rate and rhythm; No murmurs, rubs, or gallops  ABDOMEN: Soft, Nontender, Nondistended; Bowel sounds present  EXTREMITIES: LLE, dressing c/d/i, wiggles toes, in splint, RLE no calf tenderness or edema  SKIN: No rashes or lesions    LABS:                        11.7   13.34 )-----------( 180      ( 07 Mar 2019 08:15 )             35.8     03-07    141  |  107  |  27<H>  ----------------------------<  116<H>  4.1   |  29  |  1.18    Ca    9.1      07 Mar 2019 08:15      PT/INR - ( 07 Mar 2019 08:15 )   PT: 11.4 sec;   INR: 1.02 ratio           Urinalysis Basic - ( 07 Mar 2019 02:31 )    Color: Yellow / Appearance: Clear / S.025 / pH: x  Gluc: x / Ketone: Negative  / Bili: Negative / Urobili: Negative mg/dL   Blood: x / Protein: Negative mg/dL / Nitrite: Negative   Leuk Esterase: Negative / RBC: x / WBC x   Sq Epi: x / Non Sq Epi: x / Bacteria: x      CAPILLARY BLOOD GLUCOSE          RADIOLOGY & ADDITIONAL TESTS:    Imaging Personally Reviewed:  [ x] YES  [ ] NO    Consultant(s) Notes Reviewed:  [x ] YES  [ ] NO    Care Discussed with Consultants/Other Providers [ x] YES  [ ] NO

## 2019-03-07 NOTE — DISCHARGE NOTE PROVIDER - NSDCCPTREATMENT_GEN_ALL_CORE_FT
PRINCIPAL PROCEDURE  Procedure: Open reduction and internal fixation (ORIF) of ankle  Findings and Treatment: distal fibula ORIF, syndesmotic screw fixation

## 2019-03-07 NOTE — CONSULT NOTE ADULT - ASSESSMENT
Pt w/htn, hld oa w/fall on ice and L bimalleolar equivalent fracture.  Pt is currently medically optimized to proceed with surgery.  Hold his losartan perioperatively which he hasnt taken since yesterday. currently he is normotensive, if need to control bp later use small dose of labetolol.  can resume losartan post op.

## 2019-03-07 NOTE — DISCHARGE NOTE NURSING/CASE MANAGEMENT/SOCIAL WORK - NSDCDPATPORTLINK_GEN_ALL_CORE
You can access the [facility] Portal, offered by [facility], by registering with the following website: [email or web address]

## 2019-03-07 NOTE — DISCHARGE NOTE NURSING/CASE MANAGEMENT/SOCIAL WORK - NSDCPNDISPN_GEN_ALL_CORE
Safe use, storage and disposal of opioids when prescribed/Side effects of pain management treatment/Activities of daily living, including home environment that might     exacerbate pain or reduce effectiveness of the pain management plan of care as well as strategies to address these issues/Education provided on the pain management plan of care

## 2019-03-11 PROBLEM — M19.90 UNSPECIFIED OSTEOARTHRITIS, UNSPECIFIED SITE: Chronic | Status: ACTIVE | Noted: 2019-03-06

## 2019-03-11 PROBLEM — I10 ESSENTIAL (PRIMARY) HYPERTENSION: Chronic | Status: ACTIVE | Noted: 2019-03-07

## 2019-03-11 PROBLEM — E78.5 HYPERLIPIDEMIA, UNSPECIFIED: Chronic | Status: ACTIVE | Noted: 2019-03-07

## 2019-03-20 DIAGNOSIS — S82.832A OTHER FRACTURE OF UPPER AND LOWER END OF LEFT FIBULA, INITIAL ENCOUNTER FOR CLOSED FRACTURE: ICD-10-CM
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
## 2019-03-20 DIAGNOSIS — M19.90 UNSPECIFIED OSTEOARTHRITIS, UNSPECIFIED SITE: ICD-10-CM

## 2019-03-20 DIAGNOSIS — M25.572 PAIN IN LEFT ANKLE AND JOINTS OF LEFT FOOT: ICD-10-CM

## 2019-03-22 ENCOUNTER — APPOINTMENT (OUTPATIENT)
Dept: ORTHOPEDIC SURGERY | Facility: CLINIC | Age: 69
End: 2019-03-22
Payer: MEDICARE

## 2019-03-22 DIAGNOSIS — Z00.00 ENCOUNTER FOR GENERAL ADULT MEDICAL EXAMINATION W/OUT ABNORMAL FINDINGS: ICD-10-CM

## 2019-03-22 PROCEDURE — 73610 X-RAY EXAM OF ANKLE: CPT | Mod: LT

## 2019-03-22 PROCEDURE — 99024 POSTOP FOLLOW-UP VISIT: CPT

## 2019-03-22 NOTE — HISTORY OF PRESENT ILLNESS
[de-identified] : L ankle [de-identified] : 67 yo M s/p Left ankle open reduction and internal fixation of distal fibula fracture, fixation of syndesmosis, 3/7/19. doing well.  pain controlled.  compliant w NWB [de-identified] : left ankle exam.\par inc healed well. no erythema\par no pain gentle passive rom\par able to move all toes\par sensations intact light touch\par bcr\par  [de-identified] : \par The following radiographs were ordered and read by me during this patients visit. I reviewed each radiograph in detail with the patient and discussed the findings as highlighted below. \par 3 views of the left ankle were obtained today. Status post open reduction internal fixation lateral plate and screws and syndesmotic fixation. Intact hardware. Mortise reduced\par \par  [de-identified] : 67 yo M s/p Left ankle open reduction and internal fixation of distal fibula fracture, fixation of syndesmosis, 3/7/19. [de-identified] : 2 weeks status post ORIF left ankle doing well. Sutures removed Steri-Strips placed. Continue nonweightbearing placed in cam boot. Physical therapy. Followup one month. All questions answered

## 2019-04-26 ENCOUNTER — APPOINTMENT (OUTPATIENT)
Dept: ORTHOPEDIC SURGERY | Facility: CLINIC | Age: 69
End: 2019-04-26
Payer: MEDICARE

## 2019-04-26 PROCEDURE — 99024 POSTOP FOLLOW-UP VISIT: CPT

## 2019-04-26 PROCEDURE — 73610 X-RAY EXAM OF ANKLE: CPT | Mod: LT

## 2019-04-26 RX ORDER — OXYCODONE AND ACETAMINOPHEN 5; 325 MG/1; MG/1
5-325 TABLET ORAL
Qty: 20 | Refills: 0 | Status: ACTIVE | COMMUNITY
Start: 2019-03-06

## 2019-04-26 RX ORDER — ASCORBIC ACID 500 MG
TABLET ORAL
Refills: 0 | Status: ACTIVE | COMMUNITY

## 2019-04-26 RX ORDER — ATORVASTATIN CALCIUM 40 MG/1
40 TABLET, FILM COATED ORAL
Qty: 90 | Refills: 0 | Status: ACTIVE | COMMUNITY
Start: 2019-02-08

## 2019-04-26 RX ORDER — SERTRALINE HYDROCHLORIDE 100 MG/1
100 TABLET, FILM COATED ORAL
Qty: 135 | Refills: 0 | Status: ACTIVE | COMMUNITY
Start: 2018-12-20

## 2019-04-26 RX ORDER — SERTRALINE HYDROCHLORIDE 20 MG/ML
SOLUTION, CONCENTRATE ORAL
Refills: 0 | Status: ACTIVE | COMMUNITY

## 2019-04-26 RX ORDER — LOSARTAN POTASSIUM AND HYDROCHLOROTHIAZIDE 25; 100 MG/1; MG/1
100-25 TABLET ORAL
Qty: 90 | Refills: 0 | Status: ACTIVE | COMMUNITY
Start: 2019-04-08

## 2019-04-26 RX ORDER — MULTIVITAMIN
TABLET ORAL
Refills: 0 | Status: ACTIVE | COMMUNITY

## 2019-04-26 RX ORDER — ONDANSETRON 4 MG/1
4 TABLET ORAL
Qty: 9 | Refills: 0 | Status: ACTIVE | COMMUNITY
Start: 2019-03-07

## 2019-04-26 RX ORDER — OXYCODONE 5 MG/1
5 TABLET ORAL
Qty: 40 | Refills: 0 | Status: ACTIVE | COMMUNITY
Start: 2019-03-07

## 2019-04-26 RX ORDER — CELECOXIB 50 MG/1
CAPSULE ORAL
Refills: 0 | Status: ACTIVE | COMMUNITY

## 2019-04-26 RX ORDER — CELECOXIB 200 MG/1
200 CAPSULE ORAL
Qty: 90 | Refills: 0 | Status: ACTIVE | COMMUNITY
Start: 2018-10-25

## 2019-04-26 RX ORDER — ATORVASTATIN CALCIUM 80 MG/1
TABLET, FILM COATED ORAL
Refills: 0 | Status: ACTIVE | COMMUNITY

## 2019-04-26 RX ORDER — CHROMIUM 200 MCG
TABLET ORAL
Refills: 0 | Status: ACTIVE | COMMUNITY

## 2019-04-29 NOTE — HISTORY OF PRESENT ILLNESS
[de-identified] : L ankle [de-identified] : 67 yo M s/p Left ankle open reduction and internal fixation of distal fibula fracture, fixation of syndesmosis, 3/7/19.  He is doing well, happy w progress so far.  Going to PT.  Not taking pain medicine.  Denies numbness/tingling. [de-identified] : left ankle exam.\par inc healed well. no erythema\par no pain gentle passive rom\par able to move all toes\par sensations intact light touch\par bcr\par  [de-identified] : \par The following radiographs were ordered and read by me during this patients visit. I reviewed each radiograph in detail with the patient and discussed the findings as highlighted below. \par \par 3 views L ankle demonstrate interval healing. well positioned hardware. mmortise reduced [de-identified] : 67 yo M s/p Left ankle open reduction and internal fixation of distal fibula fracture, fixation of syndesmosis, 3/7/19. [de-identified] : 3 views left ankle obtained today. Interval healing of fibula shaft fracture syndesmosis is reduced.\par \par 6 weeke s/p ORIF of ankle fracture healed. Transition weightbearing as tolerated. Continue physical therapy. Followup in 6 weeks. All questions answered

## 2019-06-14 ENCOUNTER — APPOINTMENT (OUTPATIENT)
Dept: ORTHOPEDIC SURGERY | Facility: CLINIC | Age: 69
End: 2019-06-14
Payer: MEDICARE

## 2019-06-14 DIAGNOSIS — S93.432D SPRAIN OF TIBIOFIBULAR LIGAMENT OF LEFT ANKLE, SUBSEQUENT ENCOUNTER: ICD-10-CM

## 2019-06-14 DIAGNOSIS — S82.452D: ICD-10-CM

## 2019-06-14 PROCEDURE — 73610 X-RAY EXAM OF ANKLE: CPT | Mod: LT

## 2019-06-14 PROCEDURE — 99213 OFFICE O/P EST LOW 20 MIN: CPT

## 2019-06-14 NOTE — HISTORY OF PRESENT ILLNESS
[de-identified] : L ankle [de-identified] : 67 yo M s/p Left ankle open reduction and internal fixation of distal fibula fracture, fixation of syndesmosis, 3/7/19.  He is doing well, happy w progress so far.  Going to PT.  Not taking pain medicine.  Denies numbness/tingling. [de-identified] : The following radiographs were ordered and read by me during this patients visit. I reviewed each radiograph in detail with the patient and discussed the findings as highlighted below. \par \par 3 views left ankle were obtained today. There is a healed ankle fracture with syndesmotic screw is broken the mortise is reduced\par  [de-identified] : left ankle exam.\par inc healed well. no erythema\par no pain gentle passive rom\par able to move all toes\par sensations intact light touch\par bcr\par  [de-identified] : 69 yo M s/p Left ankle open reduction and internal fixation of distal fibula fracture, fixation of syndesmosis, 3/7/19. [de-identified] : \par Healed left ankle fracture. Weightbearing regular sneaker his return to activities as tolerated. He will follow up as needed. All questions answered

## 2020-07-31 ENCOUNTER — EMERGENCY (EMERGENCY)
Facility: HOSPITAL | Age: 70
LOS: 1 days | Discharge: HOME | End: 2020-07-31
Attending: STUDENT IN AN ORGANIZED HEALTH CARE EDUCATION/TRAINING PROGRAM | Admitting: STUDENT IN AN ORGANIZED HEALTH CARE EDUCATION/TRAINING PROGRAM
Payer: MEDICARE

## 2020-07-31 VITALS
TEMPERATURE: 98 F | OXYGEN SATURATION: 100 % | HEART RATE: 71 BPM | DIASTOLIC BLOOD PRESSURE: 74 MMHG | RESPIRATION RATE: 16 BRPM | SYSTOLIC BLOOD PRESSURE: 119 MMHG

## 2020-07-31 PROCEDURE — 99281 EMR DPT VST MAYX REQ PHY/QHP: CPT

## 2020-07-31 RX ORDER — SERTRALINE 25 MG/1
2 TABLET, FILM COATED ORAL
Qty: 30 | Refills: 0
Start: 2020-07-31 | End: 2020-08-14

## 2020-07-31 NOTE — ED PROVIDER NOTE - PATIENT PORTAL LINK FT
You can access the FollowMyHealth Patient Portal offered by [facility] by registering at the following website: [email or web address]. By joining [employer]’s FollowMyHealth portal, you will also be able to view your health information using other applications (apps) compatible with our system.

## 2020-07-31 NOTE — ED PROVIDER NOTE - CLINICAL SUMMARY MEDICAL DECISION MAKING FREE TEXT BOX
70 year old male with PMH of MDD (on Sertraline) presents to the ED for medication refill. Pt states he only has a 2 day supply left and is afraid that he wont reach his psychiatrist this weekend for a refill. HD stable. Imp: Medication refill. No acute complaints at this time. Plan: Rx sent to pharmacy. Follow up. Strict return precautions.

## 2020-07-31 NOTE — ED PROVIDER NOTE - ATTENDING CONTRIBUTION TO CARE
71 y/o M with PMH anxiety depression on zoloft 200mg QHS p/w inability to refill medications. pt states he feels well denies SI/HI is unable to refill his medication and could not reach his PCP to get the medication. Pt has 4 pills left . requesting refill.  denies fever, chills, chest pain, SOB, abdominal pain, diarrhea, dysuria, syncope, bleeding, new rash,weakness, numbness, blurred vision    ROS  otherwise negative as per HPI  Gen: Awake, Alert, WD, WN, NAD  Head:  NC/AT  Eyes:  PERRL, EOMI, Conjunctiva pink, lids normal, no scleral icterus  Neck: supple, nontender, no meningismus, no JVD, trachea midline  Cardiac/CV:  S1 S2, RRR, no M/G/R  Respiratory/Pulm:  CTAB, good air movement, normal resp effort, no wheezes/stridor/retractions/rales/rhonchi  Gastrointestinal/Abdomen:  Soft, nontender, nondistended, +BS, no rebound/guarding  Ext:  warm, well perfused, moving all extremities spontaneously, no peripheral edema, distal pulses intact  Skin: intact, no rash  Neuro:  AAOx3, sensation intact, motor 5/5 x 4 extremities, normal gait, speech clear  mdm as above

## 2020-07-31 NOTE — ED PROVIDER NOTE - PROGRESS NOTE DETAILS
Dr. Rico: I have personally seen and examined this patient at the bedside. I have fully participated in the care of this patient. I have reviewed all pertinent clinical information, including history, physical exam, plan and the PA's note and agree except as noted. HPI above as by me. PE above as by me. DDX PLAN

## 2020-07-31 NOTE — ED PROVIDER NOTE - NSFOLLOWUPINSTRUCTIONS_ED_ALL_ED_FT
Please follow up with your psychiatrist next week.  If you have any new, worsened or concerning symptoms, please return to the emergency department immediately.

## 2020-07-31 NOTE — ED ADULT TRIAGE NOTE - CHIEF COMPLAINT QUOTE
Pt presents to ED for medication refill. Pt unable to get in touch with psychiatrist for Zoloft refill. Pt offers no other complaints

## 2020-07-31 NOTE — ED PROVIDER NOTE - OBJECTIVE STATEMENT
70 year old male with PMH of MDD (on Sertraline) presents to the ED for medication refill. Pt states he only has a 2 day supply left and is afraid that he wont reach his psychiatrist this weekend for a refill. Pt states he feels fine now but will get angry and burst out at his wife if he dose not take his prescribed Sertraline. Pt denies any SI/HI ideations at this time. Pt denies any somatic complaints.

## 2023-06-02 NOTE — PATIENT PROFILE ADULT - NSASFUNCLEVELADLTRANSFER_GEN_A_NUR
What Type Of Note Output Would You Prefer (Optional)?: Standard Output How Severe Is Your Skin Lesion?: mild Has Your Skin Lesion Been Treated?: not been treated Is This A New Presentation, Or A Follow-Up?: Skin Lesions 3 = assistive equipment and person

## 2024-01-09 NOTE — H&P ADULT - I WAS PHYSICALLY PRESENT FOR THE KEY PORTIONS OF THE EVALUATION AND MANAGEMENT (E/M) SERVICE PROVIDED.  I AGREE WITH THE ABOVE HISTORY, PHYSICAL, AND PLAN WHICH I HAVE REVIEWED AND EDITED WHERE APPROPRIATE
"    George Damon is a 61 y.o. male.     History of Present Illness  61-year-old white male smoker with history of type 2 diabetes, COPD, CAD/hypertension, CHF, and hyperlipidemia who comes in today for 6-month follow-up visit    Blood pressure 126/76 heart rate 66 he denies any chest pain, dyspnea, tachycardia or dizziness    Patient states he has been having a hard time getting his strips to check his diabetes since he changed insurances we will try to reorder if the pharmacy still saying that they are not covered I advised patient to call number on the back of his insurance card and find out which strips are covered.  He states his sugars are normally pretty good but he has not checked them for couple months been he has not had any strips    Weight is 262 with a BMI of 43.6.  He has had 3 COVID vaccines up-to-date on eye exam PSA still declines colon cancer screening            Fasting blood work  Refill medications  If test strips are not covered call insurance company to find out why  Follow-up 6 months       The following portions of the patient's history were reviewed and updated as appropriate: allergies, current medications, past family history, past medical history, past social history, past surgical history, and problem list.    Vitals:    01/09/24 0959   BP: 126/76   BP Location: Right arm   Patient Position: Sitting   Cuff Size: Adult   Pulse: 67   Temp: 97.4 °F (36.3 °C)   TempSrc: Infrared   SpO2: 92%   Weight: 119 kg (262 lb)   Height: 165.1 cm (65\")     Body mass index is 43.6 kg/m².    Past Medical History:   Diagnosis Date    Accidental fall     Acute respiratory failure with hypercapnia     CAD (coronary artery disease)     Congestive heart failure     COPD with acute exacerbation     Diabetes mellitus, type II     Diastolic dysfunction     Enlarged heart     Eye pain, left     GERD (gastroesophageal reflux disease)     (gastric reflex)    Headache     Hyperlipidemia     Hypertension     Knee " pain, left     Lumbar back pain     With radiculopathy    Nicotine abuse     Dependence    Nicotine dependence 10/9/2015    Obesity     Onychauxis     Pain in joint of right knee     Pneumonia, bacterial     Rib pain on right side     Toe infection     Wrist pain, left      Past Surgical History:   Procedure Laterality Date    CARDIAC CATHETERIZATION  08/18/2015     Family History   Problem Relation Age of Onset    Diabetes Other     Cancer Other     Heart disease Father        There is no immunization history on file for this patient.    Office Visit on 08/02/2023   Component Date Value Ref Range Status    Hemoglobin A1C 08/02/2023 8.1 (H)  4.8 - 5.6 % Final    Comment:          Prediabetes: 5.7 - 6.4           Diabetes: >6.4           Glycemic control for adults with diabetes: <7.0           Review of Systems   Constitutional: Negative.    HENT: Negative.     Respiratory: Negative.     Cardiovascular: Negative.    Gastrointestinal: Negative.    Genitourinary: Negative.    Musculoskeletal: Negative.    Skin: Negative.    Neurological: Negative.    Psychiatric/Behavioral: Negative.         Objective   Physical Exam  Constitutional:       Appearance: Normal appearance.   HENT:      Head: Normocephalic.   Cardiovascular:      Rate and Rhythm: Normal rate and regular rhythm.      Pulses: Normal pulses.      Heart sounds: Normal heart sounds.   Pulmonary:      Effort: Pulmonary effort is normal.      Breath sounds: Normal breath sounds.   Abdominal:      General: Bowel sounds are normal.   Musculoskeletal:         General: Normal range of motion.   Skin:     General: Skin is warm.   Neurological:      General: No focal deficit present.      Mental Status: He is alert and oriented to person, place, and time.   Psychiatric:         Mood and Affect: Mood normal.         Behavior: Behavior normal.         Procedures    Assessment & Plan   Diagnoses and all orders for this visit:    1. Type 2 diabetes mellitus with  hyperglycemia, with long-term current use of insulin (Primary)  -     Comprehensive Metabolic Panel  -     Hemoglobin A1c    2. Mixed hyperlipidemia  -     Lipid Panel With LDL / HDL Ratio    3. Preventative health care  -     CBC & Differential    Other orders  -     lisinopril (PRINIVIL,ZESTRIL) 10 MG tablet; Take 1 tablet by mouth Daily.  Dispense: 90 tablet; Refill: 0  -     glipizide (GLUCOTROL) 5 MG tablet; Take 0.5 tablets by mouth Daily.  Dispense: 45 tablet; Refill: 0  -     albuterol sulfate  (90 Base) MCG/ACT inhaler; Inhale 2 puffs Every 4 (Four) Hours As Needed for Wheezing.  Dispense: 18 g; Refill: 5  -     budesonide-formoterol (Symbicort) 80-4.5 MCG/ACT inhaler; Inhale 2 puffs 2 (Two) Times a Day.  Dispense: 10.2 g; Refill: 12  -     carbidopa-levodopa (Sinemet)  MG per tablet; Take 1 tablet by mouth 3 (Three) Times a Day.  Dispense: 90 tablet; Refill: 1  -     glucose blood (Accu-Chek Guide) test strip; Accu-Chek Guide Test Strips Check BS 3 times a day DX Code E11.9  Pt is Insulin Dependent  Dispense: 300 each; Refill: 5           Current Outpatient Medications:     Accu-Chek Softclix Lancets lancets, Check BS 3 times daily DX CODE E11.9 Pt is Insulin Dependent, Disp: 100 each, Rfl: 12    acetaminophen (TYLENOL) 500 MG tablet, Take 1 tablet by mouth Every 6 (Six) Hours As Needed for Mild Pain., Disp: 60 tablet, Rfl: 2    albuterol sulfate  (90 Base) MCG/ACT inhaler, Inhale 2 puffs Every 4 (Four) Hours As Needed for Wheezing., Disp: 18 g, Rfl: 5    atorvastatin (LIPITOR) 20 MG tablet, TAKE 1 TABLET BY MOUTH EVERYDAY AT BEDTIME, Disp: 90 tablet, Rfl: 1    Blood Glucose Monitoring Suppl (Accu-Chek Guide Me) w/Device kit, 1 Device Take As Directed. Accu-Chek Guide Me Kit Check BS 3 times a day DX Code E11.9.  Pt is Insulin Dependent, Disp: 1 kit, Rfl: 0    budesonide-formoterol (Symbicort) 80-4.5 MCG/ACT inhaler, Inhale 2 puffs 2 (Two) Times a Day., Disp: 10.2 g, Rfl: 12     butalbital-acetaminophen-caffeine (FIORICET, ESGIC) -40 MG per tablet, Take 1 tablet by mouth Every 4 (Four) Hours As Needed for Headache., Disp: 30 tablet, Rfl: 2    carbidopa-levodopa (Sinemet)  MG per tablet, Take 1 tablet by mouth 3 (Three) Times a Day., Disp: 90 tablet, Rfl: 1    glipizide (GLUCOTROL) 5 MG tablet, Take 0.5 tablets by mouth Daily., Disp: 45 tablet, Rfl: 0    glucose blood (Accu-Chek Guide) test strip, Accu-Chek Guide Test Strips Check BS 3 times a day DX Code E11.9  Pt is Insulin Dependent, Disp: 300 each, Rfl: 5    ibuprofen (ADVIL,MOTRIN) 800 MG tablet, Take 1 tablet by mouth Every 8 (Eight) Hours As Needed for Mild Pain., Disp: 60 tablet, Rfl: 3    insulin aspart (novoLOG FLEXPEN) 100 UNIT/ML solution pen-injector sc pen, Inject 2-4 Units under the skin into the appropriate area as directed 3 (Three) Times a Day With Meals. Sliding Scale Lower than 250 = 2 units Greater than 250 = 4 units, Disp: 5 pen, Rfl: 5    Insulin Pen Needle (B-D ULTRAFINE III SHORT PEN) 31G X 8 MM misc, Use as directed with Insulin, Disp: 300 each, Rfl: 2    lisinopril (PRINIVIL,ZESTRIL) 10 MG tablet, Take 1 tablet by mouth Daily., Disp: 90 tablet, Rfl: 0           GIFTY Khan 1/9/2024 12:14 EST  This note has been electronically signed   Statement Selected

## 2024-01-22 NOTE — DISCHARGE NOTE PROVIDER - NSDCCPCAREPLAN_GEN_ALL_CORE_FT
January 22, 2024        Adolfo Steel  [address]  [address]  [facility] [facility] [address]  Phone: [phone]  Fax: [phone]             [facility]  [address]  [facility] [address]  Phone: [phone]   Patient: Andra Newell   MR Number: [id number]   YOB: 1958   Date of Visit: 1/22/2024       Dear Dr. Adolfo Steel    Thank you for referring Andra Newell to me for evaluation. Attached you will find relevant portions of my assessment and plan of care.    If you have questions, please do not hesitate to call me. I look forward to following Andra Newell along with you.    Sincerely,    Day Jose NP    Enclosure    If you would like to receive this communication electronically, please contact [email or web address] or [phone] to request [employer] access.    [employer] is a tool which provides read-only access to select patient information with whom you have a relationship. Its easy to use and provides real time access to review your patients record including encounter summaries, notes, results, and demographic information.    If you feel you have received this communication in error or would no longer like to receive these types of communications, please e-mail [email or web address]    PRINCIPAL DISCHARGE DIAGNOSIS  Problem: Other closed fracture of distal end of left fibula, initial encounter  Assessment and Plan of Treatment: 1. Pain control prn.   2. NWB LLE in splint  3. Keep splint CDI. do not get wet. do not walk on splint.   4. Crutches or walker  5. Elevate on pillows.   6. Do not remove splint.   7. FU with Dr. Byrd in 7 days - call office for appt.   8. Take ASA 325mg PO BID x 30 days as written. Fill Rx and start 3/8 in am.

## 2024-06-18 ENCOUNTER — OUTPATIENT (OUTPATIENT)
Dept: OUTPATIENT SERVICES | Facility: HOSPITAL | Age: 74
LOS: 1 days | End: 2024-06-18
Payer: MEDICARE

## 2024-06-18 VITALS
SYSTOLIC BLOOD PRESSURE: 135 MMHG | OXYGEN SATURATION: 96 % | RESPIRATION RATE: 12 BRPM | DIASTOLIC BLOOD PRESSURE: 86 MMHG | HEART RATE: 59 BPM | TEMPERATURE: 98 F | WEIGHT: 214.95 LBS | HEIGHT: 67 IN

## 2024-06-18 DIAGNOSIS — E11.9 TYPE 2 DIABETES MELLITUS WITHOUT COMPLICATIONS: ICD-10-CM

## 2024-06-18 DIAGNOSIS — G47.33 OBSTRUCTIVE SLEEP APNEA (ADULT) (PEDIATRIC): ICD-10-CM

## 2024-06-18 DIAGNOSIS — N48.6 INDURATION PENIS PLASTICA: ICD-10-CM

## 2024-06-18 DIAGNOSIS — Z98.890 OTHER SPECIFIED POSTPROCEDURAL STATES: Chronic | ICD-10-CM

## 2024-06-18 DIAGNOSIS — I10 ESSENTIAL (PRIMARY) HYPERTENSION: ICD-10-CM

## 2024-06-18 LAB
A1C WITH ESTIMATED AVERAGE GLUCOSE RESULT: 6.3 % — HIGH (ref 4–5.6)
ANION GAP SERPL CALC-SCNC: 12 MMOL/L — SIGNIFICANT CHANGE UP (ref 5–17)
BUN SERPL-MCNC: 20 MG/DL — SIGNIFICANT CHANGE UP (ref 7–23)
CALCIUM SERPL-MCNC: 10.2 MG/DL — SIGNIFICANT CHANGE UP (ref 8.4–10.5)
CHLORIDE SERPL-SCNC: 102 MMOL/L — SIGNIFICANT CHANGE UP (ref 96–108)
CO2 SERPL-SCNC: 26 MMOL/L — SIGNIFICANT CHANGE UP (ref 22–31)
CREAT SERPL-MCNC: 1.14 MG/DL — SIGNIFICANT CHANGE UP (ref 0.5–1.3)
EGFR: 67 ML/MIN/1.73M2 — SIGNIFICANT CHANGE UP
ESTIMATED AVERAGE GLUCOSE: 134 MG/DL — HIGH (ref 68–114)
GLUCOSE SERPL-MCNC: 136 MG/DL — HIGH (ref 70–99)
HCT VFR BLD CALC: 40.3 % — SIGNIFICANT CHANGE UP (ref 39–50)
HGB BLD-MCNC: 12.8 G/DL — LOW (ref 13–17)
MCHC RBC-ENTMCNC: 28.8 PG — SIGNIFICANT CHANGE UP (ref 27–34)
MCHC RBC-ENTMCNC: 31.8 GM/DL — LOW (ref 32–36)
MCV RBC AUTO: 90.8 FL — SIGNIFICANT CHANGE UP (ref 80–100)
NRBC # BLD: 0 /100 WBCS — SIGNIFICANT CHANGE UP (ref 0–0)
PLATELET # BLD AUTO: 191 K/UL — SIGNIFICANT CHANGE UP (ref 150–400)
POTASSIUM SERPL-MCNC: 4.5 MMOL/L — SIGNIFICANT CHANGE UP (ref 3.5–5.3)
POTASSIUM SERPL-SCNC: 4.5 MMOL/L — SIGNIFICANT CHANGE UP (ref 3.5–5.3)
RBC # BLD: 4.44 M/UL — SIGNIFICANT CHANGE UP (ref 4.2–5.8)
RBC # FLD: 13.8 % — SIGNIFICANT CHANGE UP (ref 10.3–14.5)
SODIUM SERPL-SCNC: 140 MMOL/L — SIGNIFICANT CHANGE UP (ref 135–145)
WBC # BLD: 7.65 K/UL — SIGNIFICANT CHANGE UP (ref 3.8–10.5)
WBC # FLD AUTO: 7.65 K/UL — SIGNIFICANT CHANGE UP (ref 3.8–10.5)

## 2024-06-18 PROCEDURE — 80048 BASIC METABOLIC PNL TOTAL CA: CPT

## 2024-06-18 PROCEDURE — 83036 HEMOGLOBIN GLYCOSYLATED A1C: CPT

## 2024-06-18 PROCEDURE — 85027 COMPLETE CBC AUTOMATED: CPT

## 2024-06-18 PROCEDURE — G0463: CPT

## 2024-06-18 RX ORDER — SERTRALINE HYDROCHLORIDE 100 MG/1
2 TABLET, FILM COATED ORAL
Refills: 0 | DISCHARGE

## 2024-06-18 RX ORDER — ASPIRIN 325 MG/1
1 TABLET, FILM COATED ORAL
Refills: 0 | DISCHARGE

## 2024-06-18 RX ORDER — METFORMIN HYDROCHLORIDE 850 MG/1
1 TABLET, FILM COATED ORAL
Refills: 0 | DISCHARGE

## 2024-06-18 RX ORDER — ATORVASTATIN CALCIUM 20 MG/1
1 TABLET, FILM COATED ORAL
Refills: 0 | DISCHARGE

## 2024-06-18 RX ORDER — AMLODIPINE BESYLATE 2.5 MG/1
1 TABLET ORAL
Refills: 0 | DISCHARGE

## 2024-06-18 RX ORDER — LOSARTAN/HYDROCHLOROTHIAZIDE 100MG-25MG
1 TABLET ORAL
Refills: 0 | DISCHARGE

## 2024-06-18 RX ORDER — CELECOXIB 100 MG/1
1 CAPSULE ORAL
Refills: 0 | DISCHARGE

## 2024-06-18 RX ORDER — METOPROLOL TARTRATE 50 MG
1 TABLET ORAL
Refills: 0 | DISCHARGE

## 2024-06-18 RX ORDER — CRANBERRY FRUIT EXTRACT 650 MG
0 CAPSULE ORAL
Refills: 0 | DISCHARGE

## 2024-06-18 RX ORDER — SIMVASTATIN 20 MG/1
1 TABLET, FILM COATED ORAL
Qty: 0 | Refills: 0 | DISCHARGE